# Patient Record
Sex: FEMALE | Race: WHITE | Employment: OTHER | URBAN - METROPOLITAN AREA
[De-identification: names, ages, dates, MRNs, and addresses within clinical notes are randomized per-mention and may not be internally consistent; named-entity substitution may affect disease eponyms.]

---

## 2017-02-23 ENCOUNTER — TRANSFERRED RECORDS (OUTPATIENT)
Dept: HEALTH INFORMATION MANAGEMENT | Facility: CLINIC | Age: 82
End: 2017-02-23

## 2020-01-07 ENCOUNTER — APPOINTMENT (OUTPATIENT)
Age: 85
Setting detail: DERMATOLOGY
End: 2020-01-07

## 2020-01-07 VITALS — WEIGHT: 125 LBS | RESPIRATION RATE: 15 BRPM | HEIGHT: 63 IN

## 2020-01-07 DIAGNOSIS — L24 IRRITANT CONTACT DERMATITIS: ICD-10-CM

## 2020-01-07 PROBLEM — L24.4 IRRITANT CONTACT DERMATITIS DUE TO DRUGS IN CONTACT WITH SKIN: Status: ACTIVE | Noted: 2020-01-07

## 2020-01-07 PROCEDURE — OTHER COUNSELING: OTHER

## 2020-01-07 PROCEDURE — 99202 OFFICE O/P NEW SF 15 MIN: CPT

## 2020-01-07 ASSESSMENT — LOCATION DETAILED DESCRIPTION DERM
LOCATION DETAILED: NASAL SUPRATIP
LOCATION DETAILED: SUPERIOR MID FOREHEAD

## 2020-01-07 ASSESSMENT — LOCATION SIMPLE DESCRIPTION DERM
LOCATION SIMPLE: SUPERIOR FOREHEAD
LOCATION SIMPLE: NOSE

## 2020-01-07 ASSESSMENT — LOCATION ZONE DERM
LOCATION ZONE: FACE
LOCATION ZONE: NOSE

## 2020-01-07 NOTE — HPI: RASH
How Severe Is Your Rash?: mild
Is This A New Presentation, Or A Follow-Up?: Rash
Additional History: No pain. Started using fluorouracil 3 weeks ago. Used bid for 2 weeks and stopped about 1 week ago.

## 2020-01-07 NOTE — PROCEDURE: COUNSELING
Detail Level: Detailed
Patient Specific Counseling (Will Not Stick From Patient To Patient): Discussed that this is likely a result of the fluorouracil use. However, it is difficult to say at this point in time what was there originally since it had been present for a day or two before she started using the fluorouracil. Recommended applying Aquaphor twice per day, and following up in three weeks from now. Patient expressed understanding. She has a second lesion on the frontal hairline that showed up at around the same time and she had been applying there fluorouracil to this area as well. Discontinue fluorouracil. Patient and  expressed understanding.

## 2020-06-09 ENCOUNTER — TRANSFERRED RECORDS (OUTPATIENT)
Dept: HEALTH INFORMATION MANAGEMENT | Facility: CLINIC | Age: 85
End: 2020-06-09

## 2020-06-26 ENCOUNTER — TRANSFERRED RECORDS (OUTPATIENT)
Dept: HEALTH INFORMATION MANAGEMENT | Facility: CLINIC | Age: 85
End: 2020-06-26

## 2021-01-11 ENCOUNTER — OFFICE VISIT (OUTPATIENT)
Dept: PHYSICAL MEDICINE AND REHAB | Facility: CLINIC | Age: 86
End: 2021-01-11
Payer: COMMERCIAL

## 2021-01-11 VITALS
OXYGEN SATURATION: 96 % | RESPIRATION RATE: 16 BRPM | HEART RATE: 70 BPM | DIASTOLIC BLOOD PRESSURE: 84 MMHG | SYSTOLIC BLOOD PRESSURE: 166 MMHG

## 2021-01-11 DIAGNOSIS — M53.3 DISORDER OF SACRUM: ICD-10-CM

## 2021-01-11 DIAGNOSIS — G57.01 PYRIFORMIS SYNDROME, RIGHT: ICD-10-CM

## 2021-01-11 DIAGNOSIS — G89.29 CHRONIC GLUTEAL PAIN: ICD-10-CM

## 2021-01-11 DIAGNOSIS — M79.18 MYALGIA, OTHER SITE: ICD-10-CM

## 2021-01-11 DIAGNOSIS — M79.18 CHRONIC GLUTEAL PAIN: ICD-10-CM

## 2021-01-11 DIAGNOSIS — M70.61 TROCHANTERIC BURSITIS OF RIGHT HIP: Primary | ICD-10-CM

## 2021-01-11 PROCEDURE — 20550 NJX 1 TENDON SHEATH/LIGAMENT: CPT | Performed by: PHYSICAL MEDICINE & REHABILITATION

## 2021-01-11 PROCEDURE — 20552 NJX 1/MLT TRIGGER POINT 1/2: CPT | Mod: 51 | Performed by: PHYSICAL MEDICINE & REHABILITATION

## 2021-01-11 PROCEDURE — 99215 OFFICE O/P EST HI 40 MIN: CPT | Mod: 25 | Performed by: PHYSICAL MEDICINE & REHABILITATION

## 2021-01-11 PROCEDURE — 20610 DRAIN/INJ JOINT/BURSA W/O US: CPT | Mod: 51 | Performed by: PHYSICAL MEDICINE & REHABILITATION

## 2021-01-11 RX ORDER — METOPROLOL TARTRATE 50 MG
75 TABLET ORAL
COMMUNITY
Start: 2020-08-14

## 2021-01-11 RX ORDER — ALENDRONATE SODIUM 70 MG/1
TABLET ORAL
COMMUNITY
Start: 2020-08-27

## 2021-01-11 RX ORDER — OMEPRAZOLE 20 MG/1
20 TABLET, DELAYED RELEASE ORAL
COMMUNITY
Start: 2020-08-27

## 2021-01-11 RX ORDER — GABAPENTIN 100 MG/1
CAPSULE ORAL
COMMUNITY
Start: 2020-08-27

## 2021-01-11 RX ORDER — PREDNISONE 5 MG/1
5 TABLET ORAL DAILY
COMMUNITY
Start: 2020-12-01

## 2021-01-11 RX ORDER — AMLODIPINE BESYLATE 5 MG/1
5 TABLET ORAL
COMMUNITY
Start: 2020-08-27

## 2021-01-11 RX ORDER — SIMVASTATIN 20 MG
TABLET ORAL
COMMUNITY
Start: 2020-11-10

## 2021-01-11 ASSESSMENT — PAIN SCALES - GENERAL: PAINLEVEL: MILD PAIN (2)

## 2021-01-11 NOTE — PROGRESS NOTES
The patient returns for a follow-up visit for ongoing issues related to chronic right hip and buttock pain.    She was last seen by me on June 26, 2020 at [facility], [facility].  She is accompanied by her daughter.  Her interval history is notable for return of pain affecting her function.  She is using 2 pillows to sit and also is using a transport wheelchair.  She notes no numbness or tingling.  She has weakness in her left foot from chronic foot drop and uses a brace.  She denies any issues with sleep.  She is comfortable when she is sitting in the chair.  However when she walks she finds it painful on the right hip and gluteal region.    She has had previous injection therapies to the SI joint with relief.    Her past medical history is notable for arthritis, decreased hearing, degenerative scoliosis, disorder of sacrum, hypertension, lumbosacral spondylosis, left shoulder impingement, and thoracic spine pain.    Past surgical history is notable for breast surgery, 1995, knee surgery in 2014.    Family history is notable for breast cancer.    Zoledronic Acid, Flu like sxs.    Her immunizations are up-to-date.    Current Outpatient Medications   Medication Sig Dispense Refill     alendronate (FOSAMAX) 70 MG tablet Take on empty stomach with full glass of water. Do not lie down for 1 hr.TAKE 1 TAB ONCE A WEEK IN THE MORNING ON EMPTY STOMACH W/FULL GLASS OF WATER. DONT LIE DOWN FOR 1 HR       amLODIPine (NORVASC) 5 MG tablet Take 5 mg by mouth       ferrous sulfate (SLO-FE) 142 (45 Fe) MG CR tablet One tab by mouth every other day       gabapentin (NEURONTIN) 100 MG capsule TAKE 1 CAPSULE BY MOUTH EVERYDAY AT BEDTIME       metoprolol tartrate (LOPRESSOR) 50 MG tablet Take 75 mg by mouth       omeprazole (PRILOSEC OTC) 20 MG EC tablet Take 20 mg by mouth       predniSONE (DELTASONE) 5 MG tablet Take 5 mg by mouth daily       simvastatin (ZOCOR) 20 MG tablet         Review of systems is remarkable for  problems noted above otherwise negative.    BP (!) 166/84   Pulse 70   Resp 16   SpO2 96%     On examination, the patient is alert and cooperative.  She is sitting comfortably in the transport chair.  She is able to move her upper extremities without discomfort though the range is limited to less than 90 degrees.  She is able to carry out straight leg raising test bilaterally.  She has left foot drop and a left brace.  On the right side she is able to bring her foot to the neutral.    Musculoskeletal examination revealed tenderness over the right greater trochanter, right piriformis as well as over the right sacroiliac joint.  She denied any discomfort over the lumbar spine.    Impression: At this point this patient with chronic arthritis, spondylosis has features of bursitis in her right hip with piriformis syndrome as well as some sacroiliac dysfunction.  Due to her previous good response with interventions and her current limitation in function due to pain, she would benefit from injection therapy today.  I recommend injecting the 3 affected areas.  It is possible that she may need fluoroscopic guided sacroiliac joint injections if this is not sufficiently helpful.    40 minutes for the evaluation and management portion of the visit, greater than 50% was for counseling on above-mentioned issues.    Procedure note: With her informed consent, after explaining the benefits and risks of the procedure, using 40 mg of Kenalog, lot number [id number], expiration September 2022, with 5 cc of 0.5% bupivacaine lot number [id number], expiration August 2022.  Using 25-gauge 2 inch needle, Ethyl chloride spray for topical anesthesia, the right trochanteric bursa was injected with 2 cc of the mixture, the right piriformis was injected with 1 cc of the mixture and finally the soft tissue around the right sacroiliac joint was injected with 3 cc of the mixture.  She tolerated the procedure well.  She did not have any change in  strength or sensory changes post injections.    She will return on an as-needed basis at this point.    Epifanio Arreola MD

## 2021-01-11 NOTE — NURSING NOTE
Chief Complaint   Patient presents with     Consult     [facility] NEW- BACK/HIP INJECTIONS       Lazarus Mccabe, EMT

## 2021-01-11 NOTE — LETTER
1/11/2021       RE: Tish Seymour  [address] [id number]     Dear Colleague,    Thank you for referring your patient, Tish Seymour, to the [facility] [place] at [facility]. Please see a copy of my visit note below.    The patient returns for a follow-up visit for ongoing issues related to chronic right hip and buttock pain.    She was last seen by me on June 26, 2020 at [facility], [facility].  She is accompanied by her daughter.  Her interval history is notable for return of pain affecting her function.  She is using 2 pillows to sit and also is using a transport wheelchair.  She notes no numbness or tingling.  She has weakness in her left foot from chronic foot drop and uses a brace.  She denies any issues with sleep.  She is comfortable when she is sitting in the chair.  However when she walks she finds it painful on the right hip and gluteal region.    She has had previous injection therapies to the SI joint with relief.    Her past medical history is notable for arthritis, decreased hearing, degenerative scoliosis, disorder of sacrum, hypertension, lumbosacral spondylosis, left shoulder impingement, and thoracic spine pain.    Past surgical history is notable for breast surgery, 1995, knee surgery in 2014.    Family history is notable for breast cancer.    Zoledronic Acid, Flu like sxs.    Her immunizations are up-to-date.    Current Outpatient Medications   Medication Sig Dispense Refill     alendronate (FOSAMAX) 70 MG tablet Take on empty stomach with full glass of water. Do not lie down for 1 hr.TAKE 1 TAB ONCE A WEEK IN THE MORNING ON EMPTY STOMACH W/FULL GLASS OF WATER. DONT LIE DOWN FOR 1 HR       amLODIPine (NORVASC) 5 MG tablet Take 5 mg by mouth       ferrous sulfate (SLO-FE) 142 (45 Fe) MG CR tablet One tab by mouth every other day       gabapentin (NEURONTIN) 100 MG  capsule TAKE 1 CAPSULE BY MOUTH EVERYDAY AT BEDTIME       metoprolol tartrate (LOPRESSOR) 50 MG tablet Take 75 mg by mouth       omeprazole (PRILOSEC OTC) 20 MG EC tablet Take 20 mg by mouth       predniSONE (DELTASONE) 5 MG tablet Take 5 mg by mouth daily       simvastatin (ZOCOR) 20 MG tablet         Review of systems is remarkable for problems noted above otherwise negative.    BP (!) 166/84   Pulse 70   Resp 16   SpO2 96%     On examination, the patient is alert and cooperative.  She is sitting comfortably in the transport chair.  She is able to move her upper extremities without discomfort though the range is limited to less than 90 degrees.  She is able to carry out straight leg raising test bilaterally.  She has left foot drop and a left brace.  On the right side she is able to bring her foot to the neutral.    Musculoskeletal examination revealed tenderness over the right greater trochanter, right piriformis as well as over the right sacroiliac joint.  She denied any discomfort over the lumbar spine.    Impression: At this point this patient with chronic arthritis, spondylosis has features of bursitis in her right hip with piriformis syndrome as well as some sacroiliac dysfunction.  Due to her previous good response with interventions and her current limitation in function due to pain, she would benefit from injection therapy today.  I recommend injecting the 3 affected areas.  It is possible that she may need fluoroscopic guided sacroiliac joint injections if this is not sufficiently helpful.    40 minutes for the evaluation and management portion of the visit, greater than 50% was for counseling on above-mentioned issues.    Procedure note: With her informed consent, after explaining the benefits and risks of the procedure, using 40 mg of Kenalog, lot number [id number], expiration September 2022, with 5 cc of 0.5% bupivacaine lot number [id number], expiration August 2022.  Using 25-gauge 2 inch needle,  Ethyl chloride spray for topical anesthesia, the right trochanteric bursa was injected with 2 cc of the mixture, the right piriformis was injected with 1 cc of the mixture and finally the soft tissue around the right sacroiliac joint was injected with 3 cc of the mixture.  She tolerated the procedure well.  She did not have any change in strength or sensory changes post injections.    She will return on an as-needed basis at this point.    Epifanio Arreola MD

## 2021-04-30 ENCOUNTER — RECORDS - HEALTHEAST (OUTPATIENT)
Dept: LAB | Facility: CLINIC | Age: 86
End: 2021-04-30

## 2021-04-30 LAB
SARS-COV-2 PCR COMMENT: NORMAL
SARS-COV-2 RNA SPEC QL NAA+PROBE: NEGATIVE
SARS-COV-2 VIRUS SPECIMEN SOURCE: NORMAL

## 2021-05-06 ENCOUNTER — RECORDS - HEALTHEAST (OUTPATIENT)
Dept: LAB | Facility: CLINIC | Age: 86
End: 2021-05-06

## 2021-08-02 ENCOUNTER — LAB REQUISITION (OUTPATIENT)
Dept: LAB | Facility: CLINIC | Age: 86
End: 2021-08-02
Payer: COMMERCIAL

## 2021-08-02 DIAGNOSIS — U07.1 COVID-19: ICD-10-CM

## 2021-08-02 PROCEDURE — U0003 INFECTIOUS AGENT DETECTION BY NUCLEIC ACID (DNA OR RNA); SEVERE ACUTE RESPIRATORY SYNDROME CORONAVIRUS 2 (SARS-COV-2) (CORONAVIRUS DISEASE [COVID-19]), AMPLIFIED PROBE TECHNIQUE, MAKING USE OF HIGH THROUGHPUT TECHNOLOGIES AS DESCRIBED BY CMS-2020-01-R: HCPCS | Mod: ORL | Performed by: FAMILY MEDICINE

## 2021-08-03 LAB — SARS-COV-2 RNA RESP QL NAA+PROBE: NEGATIVE

## (undated) RX ORDER — BUPIVACAINE HYDROCHLORIDE 2.5 MG/ML
INJECTION, SOLUTION EPIDURAL; INFILTRATION; INTRACAUDAL
Status: DISPENSED
Start: 2021-01-11

## (undated) RX ORDER — TRIAMCINOLONE ACETONIDE 40 MG/ML
INJECTION, SUSPENSION INTRA-ARTICULAR; INTRAMUSCULAR
Status: DISPENSED
Start: 2021-01-11